# Patient Record
(demographics unavailable — no encounter records)

---

## 2025-02-06 NOTE — PLAN
[FreeTextEntry1] : 27 y/o pt presents in office today for a PCOS consult. She was recently diagnosed by her previous physician before her insurance change.  *she has had significant oligomenorrhea this yr with period only in july after provera challenge and then again after plan b  She took some progesterone in July to induce her period, and she also had an induced period in September because of a morning after pill.  But prior to that she hasn't had a bleed since May of 2023 She is open to going on birth control.  * I reviewed outside labs with her: did have upper limit normal testosterone she has not yet had fasting lipid and diabetes screen so I recommend to do this here at some point*     Risks, benefits, alternative to combined oral contraceptives discussed. Patient told to carefully read package insert.  explained she won't be able to wait for a period ,but to be sure not pregnant before starting  reviewed pt literature regarding PCOS explained can lead to wt mtc issues but she is normal weight d/w pt acne/ issues TTC

## 2025-02-06 NOTE — HISTORY OF PRESENT ILLNESS
[Patient reported PAP Smear was normal] : Patient reported PAP Smear was normal [LMP unknown] : LMP unknown [N] : Patient does not use contraception [Y] : Patient is sexually active [Currently Active] : currently active [Men] : men [No] : No [Patient refuses STI testing] : Patient refuses STI testing [FreeTextEntry1] : 25 y/o pt presents in office today for a PCOS consult. She was recently diagnosed by her previous physician before her insurance change.  She took some progesterone in July to induce her period, and she also had an induced period in September because of a morning after pill.  But prior to that she hasn't had a bleed since May of 2023 She is open to going on birth control. [PapSmeardate] : 2024 Clear